# Patient Record
Sex: FEMALE | Race: WHITE | NOT HISPANIC OR LATINO
[De-identification: names, ages, dates, MRNs, and addresses within clinical notes are randomized per-mention and may not be internally consistent; named-entity substitution may affect disease eponyms.]

---

## 2020-08-18 ENCOUNTER — APPOINTMENT (OUTPATIENT)
Dept: ORTHOPEDIC SURGERY | Facility: CLINIC | Age: 70
End: 2020-08-18
Payer: MEDICARE

## 2020-08-18 VITALS — SYSTOLIC BLOOD PRESSURE: 108 MMHG | HEART RATE: 73 BPM | DIASTOLIC BLOOD PRESSURE: 67 MMHG | TEMPERATURE: 98.2 F

## 2020-08-18 DIAGNOSIS — S89.92XA UNSPECIFIED INJURY OF LEFT LOWER LEG, INITIAL ENCOUNTER: ICD-10-CM

## 2020-08-18 DIAGNOSIS — S79.912A UNSPECIFIED INJURY OF LEFT HIP, INITIAL ENCOUNTER: ICD-10-CM

## 2020-08-18 PROCEDURE — 99204 OFFICE O/P NEW MOD 45 MIN: CPT

## 2020-08-18 PROCEDURE — 73502 X-RAY EXAM HIP UNI 2-3 VIEWS: CPT | Mod: LT

## 2020-08-18 PROCEDURE — 73562 X-RAY EXAM OF KNEE 3: CPT | Mod: LT

## 2020-08-18 RX ORDER — OFLOXACIN 3 MG/ML
0.3 SOLUTION/ DROPS OPHTHALMIC
Qty: 5 | Refills: 0 | Status: ACTIVE | COMMUNITY
Start: 2020-03-08

## 2020-08-18 RX ORDER — TRIAMCINOLONE ACETONIDE 5 MG/G
0.5 CREAM TOPICAL
Qty: 45 | Refills: 0 | Status: ACTIVE | COMMUNITY
Start: 2020-07-25

## 2020-09-26 ENCOUNTER — TRANSCRIPTION ENCOUNTER (OUTPATIENT)
Age: 70
End: 2020-09-26

## 2020-10-10 ENCOUNTER — TRANSCRIPTION ENCOUNTER (OUTPATIENT)
Age: 70
End: 2020-10-10

## 2022-06-17 ENCOUNTER — APPOINTMENT (OUTPATIENT)
Dept: ORTHOPEDIC SURGERY | Facility: CLINIC | Age: 72
End: 2022-06-17
Payer: MEDICARE

## 2022-06-17 VITALS — HEIGHT: 68 IN | BODY MASS INDEX: 25.01 KG/M2 | WEIGHT: 165 LBS

## 2022-06-17 DIAGNOSIS — Z00.00 ENCOUNTER FOR GENERAL ADULT MEDICAL EXAMINATION W/OUT ABNORMAL FINDINGS: ICD-10-CM

## 2022-06-17 PROCEDURE — 99213 OFFICE O/P EST LOW 20 MIN: CPT

## 2022-06-17 PROCEDURE — 73562 X-RAY EXAM OF KNEE 3: CPT | Mod: LT

## 2022-06-17 NOTE — HISTORY OF PRESENT ILLNESS
[4] : 4 [Stairs] : stairs [] : no [FreeTextEntry1] : LT KNEE [FreeTextEntry3] : 6/15/22 [FreeTextEntry5] : PATIENT HAD A LT KNEE INJURY IN 2020 AND WENT TO PT. PATIENT RECENTLY WAS STANDING UP AND FELT A CRACK IN HER LT KNEE.  [FreeTextEntry9] : ICE [de-identified] : 10/2020

## 2022-06-17 NOTE — ASSESSMENT
[FreeTextEntry1] : MILD OA SYMPTOMS WITH NO RED FLAGS\par XRAYS SHOWING MILD LAT AND PF OA\par WILL COTNACT ME IF DEVELOPS MECHANICAL SYMPTOMS OR WORSENING PAIN\par LOW IMPACT EXERCISE, NSAIDS ADVISED

## 2022-06-17 NOTE — IMAGING
[de-identified] : Left knee Varus deformity\par Mild effusion, no warmth, no ecchymosis\par Minimal posterior tenderness to palpation \par Range of motion 5-110 with associated crepitus\par 5/5 quadriceps and hamstring strength\par Ligamentously stable\par Motor and sensory intact distally\par Mildly antalgic gait\par Negative Warren\par  [Left] : left knee [AP] : anteroposterior [Lateral] : lateral [Mild tricompartmental OA lateral narrowing] : Mild tricompartmental OA lateral narrowing

## 2022-07-01 ENCOUNTER — APPOINTMENT (OUTPATIENT)
Dept: ORTHOPEDIC SURGERY | Facility: CLINIC | Age: 72
End: 2022-07-01

## 2022-07-01 VITALS — WEIGHT: 165 LBS | BODY MASS INDEX: 25.01 KG/M2 | HEIGHT: 68 IN

## 2022-07-01 DIAGNOSIS — M17.12 UNILATERAL PRIMARY OSTEOARTHRITIS, LEFT KNEE: ICD-10-CM

## 2022-07-01 PROCEDURE — 99213 OFFICE O/P EST LOW 20 MIN: CPT

## 2022-07-01 RX ORDER — METHYLPREDNISOLONE 4 MG/1
4 TABLET ORAL
Qty: 1 | Refills: 0 | Status: ACTIVE | COMMUNITY
Start: 2022-07-01 | End: 1900-01-01

## 2022-07-01 NOTE — IMAGING
[Left] : left knee [AP] : anteroposterior [Lateral] : lateral [Mild tricompartmental OA lateral narrowing] : Mild tricompartmental OA lateral narrowing [de-identified] : Left knee Varus deformity\par Mild effusion, no warmth, no ecchymosis\par Minimal posterior tendernessand LJL to palpation \par Range of motion 5-110 with associated crepitus\par 5/5 quadriceps and hamstring strength\par Ligamentously stable\par Motor and sensory intact distally\par Mildly antalgic gait\par Negative Warren\par

## 2022-07-01 NOTE — ASSESSMENT
[FreeTextEntry1] : DISCUSSED LOW IMPACT ACTIVITY\par HESITANT ABOUT IINJECTIONS DUE TO HER UPCOMMING TRIP TO [place]\par WILL SEND OVER MDP\par SHE WILL F/U PRN AFTER HER TRIP FOR [facility]

## 2022-07-01 NOTE — HISTORY OF PRESENT ILLNESS
[de-identified] : 7/1/22: LT KNEE FOLLOW UP. FEELING SLIGHTLY BETTER THEN THE PREVIOUS VISIT. HAVING SOME STIFFNESS STILL AND SOME ISSUES WITH HER ROM. THERE IS STILL DIFFICULTY DESCENDING STAIRS AND GOING FOR PROLONGED WALKS.

## 2025-01-28 ENCOUNTER — APPOINTMENT (OUTPATIENT)
Dept: ORTHOPEDIC SURGERY | Facility: CLINIC | Age: 75
End: 2025-01-28
Payer: MEDICARE

## 2025-01-28 VITALS — BODY MASS INDEX: 25.01 KG/M2 | HEIGHT: 68 IN | WEIGHT: 165 LBS

## 2025-01-28 DIAGNOSIS — M25.552 PAIN IN LEFT HIP: ICD-10-CM

## 2025-01-28 PROCEDURE — 73502 X-RAY EXAM HIP UNI 2-3 VIEWS: CPT | Mod: LT

## 2025-01-28 PROCEDURE — 73562 X-RAY EXAM OF KNEE 3: CPT | Mod: LT

## 2025-01-28 PROCEDURE — 99203 OFFICE O/P NEW LOW 30 MIN: CPT
